# Patient Record
Sex: MALE
[De-identification: names, ages, dates, MRNs, and addresses within clinical notes are randomized per-mention and may not be internally consistent; named-entity substitution may affect disease eponyms.]

---

## 2021-12-23 ENCOUNTER — NON-APPOINTMENT (OUTPATIENT)
Age: 61
End: 2021-12-23

## 2021-12-23 ENCOUNTER — APPOINTMENT (OUTPATIENT)
Dept: UROLOGY | Facility: HOSPITAL | Age: 61
End: 2021-12-23

## 2021-12-27 LAB — SARS-COV-2 N GENE NPH QL NAA+PROBE: NOT DETECTED

## 2022-02-17 ENCOUNTER — APPOINTMENT (OUTPATIENT)
Dept: CARDIOLOGY | Facility: CLINIC | Age: 62
End: 2022-02-17
Payer: COMMERCIAL

## 2022-02-17 PROCEDURE — 93306 TTE W/DOPPLER COMPLETE: CPT

## 2022-02-26 ENCOUNTER — NON-APPOINTMENT (OUTPATIENT)
Age: 62
End: 2022-02-26

## 2022-02-27 ENCOUNTER — NON-APPOINTMENT (OUTPATIENT)
Age: 62
End: 2022-02-27

## 2023-06-12 ENCOUNTER — NON-APPOINTMENT (OUTPATIENT)
Age: 63
End: 2023-06-12

## 2023-06-12 DIAGNOSIS — Z00.00 ENCOUNTER FOR GENERAL ADULT MEDICAL EXAMINATION W/OUT ABNORMAL FINDINGS: ICD-10-CM

## 2023-06-12 DIAGNOSIS — E78.5 HYPERLIPIDEMIA, UNSPECIFIED: ICD-10-CM

## 2023-06-12 LAB
HCT VFR BLD CALC: 41.5 %
HGB BLD-MCNC: 13.4 G/DL
MCHC RBC-ENTMCNC: 21.5 PG
MCHC RBC-ENTMCNC: 32.3 G/DL
MCV RBC AUTO: 66.5 FL
PLATELET # BLD AUTO: 218 K/UL
PMV BLD: 11.5 FL
RBC # BLD: 6.24 M/UL
RBC # FLD: 14.9 %
WBC # FLD AUTO: 4.76 K/UL

## 2023-06-13 ENCOUNTER — NON-APPOINTMENT (OUTPATIENT)
Age: 63
End: 2023-06-13

## 2023-06-13 LAB
ALBUMIN SERPL ELPH-MCNC: 4.9 G/DL
ALP BLD-CCNC: 78 U/L
ALT SERPL-CCNC: 24 U/L
ANION GAP SERPL CALC-SCNC: 14 MMOL/L
AST SERPL-CCNC: 27 U/L
BILIRUB SERPL-MCNC: 0.7 MG/DL
BUN SERPL-MCNC: 18 MG/DL
CALCIUM SERPL-MCNC: 9.6 MG/DL
CHLORIDE SERPL-SCNC: 94 MMOL/L
CHOLEST SERPL-MCNC: 201 MG/DL
CO2 SERPL-SCNC: 27 MMOL/L
CREAT SERPL-MCNC: 1.1 MG/DL
EGFR: 76 ML/MIN/1.73M2
GLUCOSE SERPL-MCNC: 99 MG/DL
HDLC SERPL-MCNC: 77 MG/DL
LDLC SERPL CALC-MCNC: 110 MG/DL
NONHDLC SERPL-MCNC: 124 MG/DL
POTASSIUM SERPL-SCNC: 4.2 MMOL/L
PROT SERPL-MCNC: 7.4 G/DL
PSA SERPL-MCNC: 0.81 NG/ML
SODIUM SERPL-SCNC: 135 MMOL/L
TRIGL SERPL-MCNC: 68 MG/DL

## 2023-12-22 DIAGNOSIS — N40.0 BENIGN PROSTATIC HYPERPLASIA WITHOUT LOWER URINARY TRACT SYMPMS: ICD-10-CM

## 2023-12-26 LAB
ALBUMIN SERPL ELPH-MCNC: 4.9 G/DL
ALP BLD-CCNC: 65 U/L
ALT SERPL-CCNC: 19 U/L
ANION GAP SERPL CALC-SCNC: 15 MMOL/L
AST SERPL-CCNC: 28 U/L
BASOPHILS # BLD AUTO: 0.03 K/UL
BASOPHILS NFR BLD AUTO: 0.5 %
BILIRUB SERPL-MCNC: 0.6 MG/DL
BUN SERPL-MCNC: 18 MG/DL
CALCIUM SERPL-MCNC: 10.1 MG/DL
CHLORIDE SERPL-SCNC: 94 MMOL/L
CHOLEST SERPL-MCNC: 238 MG/DL
CO2 SERPL-SCNC: 27 MMOL/L
CREAT SERPL-MCNC: 1.1 MG/DL
EGFR: 76 ML/MIN/1.73M2
EOSINOPHIL # BLD AUTO: 0.23 K/UL
EOSINOPHIL NFR BLD AUTO: 4 %
GLUCOSE SERPL-MCNC: 98 MG/DL
HCT VFR BLD CALC: 42.4 %
HDLC SERPL-MCNC: 71 MG/DL
HGB BLD-MCNC: 13.1 G/DL
IMM GRANULOCYTES NFR BLD AUTO: 0.2 %
LDLC SERPL CALC-MCNC: 147 MG/DL
LYMPHOCYTES # BLD AUTO: 2.85 K/UL
LYMPHOCYTES NFR BLD AUTO: 50.2 %
MAN DIFF?: NORMAL
MCHC RBC-ENTMCNC: 21.1 PG
MCHC RBC-ENTMCNC: 30.9 G/DL
MCV RBC AUTO: 68.3 FL
MONOCYTES # BLD AUTO: 0.65 K/UL
MONOCYTES NFR BLD AUTO: 11.4 %
NEUTROPHILS # BLD AUTO: 1.91 K/UL
NEUTROPHILS NFR BLD AUTO: 33.7 %
NONHDLC SERPL-MCNC: 167 MG/DL
PLATELET # BLD AUTO: 236 K/UL
POTASSIUM SERPL-SCNC: 3.9 MMOL/L
PROT SERPL-MCNC: 7.3 G/DL
PSA FREE FLD-MCNC: 51 %
PSA FREE SERPL-MCNC: 0.38 NG/ML
PSA SERPL-MCNC: 0.73 NG/ML
RBC # BLD: 6.21 M/UL
RBC # FLD: 15 %
SODIUM SERPL-SCNC: 136 MMOL/L
TRIGL SERPL-MCNC: 101 MG/DL
WBC # FLD AUTO: 5.68 K/UL

## 2024-06-12 ENCOUNTER — NON-APPOINTMENT (OUTPATIENT)
Age: 64
End: 2024-06-12

## 2024-06-12 LAB
ALBUMIN SERPL ELPH-MCNC: 4.8 G/DL
ALP BLD-CCNC: 74 U/L
ALT SERPL-CCNC: 20 U/L
ANION GAP SERPL CALC-SCNC: 13 MMOL/L
APPEARANCE: CLEAR
AST SERPL-CCNC: 25 U/L
BASOPHILS # BLD AUTO: 0.03 K/UL
BASOPHILS NFR BLD AUTO: 0.5 %
BILIRUB SERPL-MCNC: 0.6 MG/DL
BILIRUBIN URINE: NEGATIVE
BLOOD URINE: NEGATIVE
BUN SERPL-MCNC: 18 MG/DL
CALCIUM SERPL-MCNC: 9.7 MG/DL
CHLORIDE SERPL-SCNC: 96 MMOL/L
CHOLEST SERPL-MCNC: 187 MG/DL
CO2 SERPL-SCNC: 28 MMOL/L
COLOR: YELLOW
CREAT SERPL-MCNC: 1.1 MG/DL
EGFR: 75 ML/MIN/1.73M2
EOSINOPHIL # BLD AUTO: 0.09 K/UL
EOSINOPHIL NFR BLD AUTO: 1.6 %
GLUCOSE QUALITATIVE U: NEGATIVE MG/DL
GLUCOSE SERPL-MCNC: 98 MG/DL
HCT VFR BLD CALC: 41.1 %
HDLC SERPL-MCNC: 66 MG/DL
HGB BLD-MCNC: 12.7 G/DL
IMM GRANULOCYTES NFR BLD AUTO: 0.2 %
KETONES URINE: NEGATIVE MG/DL
LDLC SERPL CALC-MCNC: 110 MG/DL
LEUKOCYTE ESTERASE URINE: NEGATIVE
LYMPHOCYTES # BLD AUTO: 2.52 K/UL
LYMPHOCYTES NFR BLD AUTO: 46.2 %
MAN DIFF?: NORMAL
MCHC RBC-ENTMCNC: 21 PG
MCHC RBC-ENTMCNC: 30.9 G/DL
MCV RBC AUTO: 68 FL
MONOCYTES # BLD AUTO: 0.54 K/UL
MONOCYTES NFR BLD AUTO: 9.9 %
NEUTROPHILS # BLD AUTO: 2.27 K/UL
NEUTROPHILS NFR BLD AUTO: 41.6 %
NITRITE URINE: NEGATIVE
NONHDLC SERPL-MCNC: 121 MG/DL
PH URINE: 7.5
PLATELET # BLD AUTO: 212 K/UL
PMV BLD AUTO: 0 /100 WBCS
POTASSIUM SERPL-SCNC: 4.5 MMOL/L
PROT SERPL-MCNC: 7.3 G/DL
PROTEIN URINE: NEGATIVE MG/DL
PSA SERPL-MCNC: 0.73 NG/ML
RBC # BLD: 6.04 M/UL
RBC # FLD: 15.3 %
SODIUM SERPL-SCNC: 137 MMOL/L
SPECIFIC GRAVITY URINE: 1.02
TRIGL SERPL-MCNC: 54 MG/DL
UROBILINOGEN URINE: 1 MG/DL
WBC # FLD AUTO: 5.46 K/UL

## 2024-06-13 LAB
ESTIMATED AVERAGE GLUCOSE: 117 MG/DL
HBA1C MFR BLD HPLC: 5.7 %

## 2024-10-20 ENCOUNTER — EMERGENCY (EMERGENCY)
Facility: HOSPITAL | Age: 64
LOS: 0 days | Discharge: HOME | End: 2024-10-20
Attending: EMERGENCY MEDICINE
Payer: COMMERCIAL

## 2024-10-20 VITALS
SYSTOLIC BLOOD PRESSURE: 158 MMHG | RESPIRATION RATE: 16 BRPM | WEIGHT: 139.99 LBS | TEMPERATURE: 98 F | DIASTOLIC BLOOD PRESSURE: 66 MMHG | HEART RATE: 66 BPM | OXYGEN SATURATION: 99 %

## 2024-10-20 DIAGNOSIS — Z91.013 ALLERGY TO SEAFOOD: ICD-10-CM
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
## 2024-10-20 DIAGNOSIS — M79.641 PAIN IN RIGHT HAND: ICD-10-CM
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
## 2024-10-20 DIAGNOSIS — S01.511A LACERATION WITHOUT FOREIGN BODY OF LIP, INITIAL ENCOUNTER: ICD-10-CM

## 2024-10-20 DIAGNOSIS — S69.92XA UNSPECIFIED INJURY OF LEFT WRIST, HAND AND FINGER(S), INITIAL ENCOUNTER: ICD-10-CM

## 2024-10-20 DIAGNOSIS — S60.041A CONTUSION OF RIGHT RING FINGER WITHOUT DAMAGE TO NAIL, INITIAL ENCOUNTER: ICD-10-CM

## 2024-10-20 DIAGNOSIS — K03.81 CRACKED TOOTH: ICD-10-CM

## 2024-10-20 DIAGNOSIS — S01.81XA LACERATION WITHOUT FOREIGN BODY OF OTHER PART OF HEAD, INITIAL ENCOUNTER: ICD-10-CM

## 2024-10-20 PROCEDURE — 12052 INTMD RPR FACE/MM 2.6-5.0 CM: CPT

## 2024-10-20 PROCEDURE — 73130 X-RAY EXAM OF HAND: CPT | Mod: RT

## 2024-10-20 PROCEDURE — 99283 EMERGENCY DEPT VISIT LOW MDM: CPT | Mod: 57

## 2024-10-20 PROCEDURE — 71046 X-RAY EXAM CHEST 2 VIEWS: CPT | Mod: 26

## 2024-10-20 PROCEDURE — 71046 X-RAY EXAM CHEST 2 VIEWS: CPT

## 2024-10-20 PROCEDURE — 13151 CMPLX RPR E/N/E/L 1.1-2.5 CM: CPT | Mod: XS

## 2024-10-20 PROCEDURE — 99284 EMERGENCY DEPT VISIT MOD MDM: CPT | Mod: 25

## 2024-10-20 PROCEDURE — 73130 X-RAY EXAM OF HAND: CPT | Mod: 26,RT

## 2024-10-20 PROCEDURE — 99285 EMERGENCY DEPT VISIT HI MDM: CPT

## 2024-10-20 PROCEDURE — 40831 REPAIR MOUTH LACERATION: CPT

## 2024-10-20 RX ORDER — AMOXICILLIN 250 MG/5ML
1 SUSPENSION, RECONSTITUTED, ORAL (ML) ORAL
Qty: 21 | Refills: 0
Start: 2024-10-20 | End: 2024-10-26

## 2024-10-20 NOTE — PROCEDURE NOTE - NSICDXPROCEDURE_GEN_ALL_CORE_FT
PROCEDURES:  Repair, ear, eyelid, lip, or nose, complex, 1.1 cm to 2.5 cm 20-Oct-2024 15:01:16  Corazon Bentley
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
## 2024-10-20 NOTE — ED ADULT NURSE NOTE - CHPI ED NUR SYMPTOMS NEG
JOE PORTILLO (Key: [id number])     This request has been approved.  Please note any additional information provided by [facility] [employer] at the bottom of your screen.     no drainage/no pain

## 2024-10-20 NOTE — ED PROVIDER NOTE - NSFOLLOWUPINSTRUCTIONS_ED_ALL_ED_FT
Make sure that you follow-up with plastic surgery this week to have your sutures removed.  Follow-up with your dentist within the next 24 hours.  Antibiotics to take 3 times a day for 1 week have been sent to your pharmacy.  Return to the ER for any new or worsening headache, vomiting, change in vision, neck pain or trouble breathing.  Also follow-up with employee health this week to discuss when your last tetanus vaccination was has and should be updated if greater than 10 years ago.

## 2024-10-20 NOTE — ED PROVIDER NOTE - EKG/XRAY ADDITIONAL INFORMATION
JFK: I, Adam Mccray, have done an independent interpretation of the chest x-ray film and my findings are as follows: No focal consolidation, signs of opacities or edema, pneumothorax, free air or evidence of trauma.
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
## 2024-10-20 NOTE — CONSULT NOTE ADULT - NS ATTEND AMEND GEN_ALL_CORE FT
Pt seen and examined  I performed procedure Pt seen as examined  two lacerations one external lip and one intraoral  please refer to procedure note for details of procedure

## 2024-10-20 NOTE — ED PROVIDER NOTE - PHYSICAL EXAMINATION
Gen: NAD, AOx3  Head: NCAT  HEENT: PERRL, oral mucosa moist, normal conjunctiva, oropharynx clear without exudate or erythema  Lung: CTAB, no respiratory distress, no wheezing, rales, rhonchi  CV: normal s1/s2, rrr, Normal perfusion, pulses 2+ throughout  Abd: soft, NTND, no CVA tenderness  Genitourinary: no pelvic tenderness  MSK: No edema, no visible deformities, full range of motion in all 4 extremities, RUE: 2+ pulse, AROM, edema to 1st digit with mild TTP, ecchymosis to 4th digit  Neuro: CN II-XII grossly intact, No focal neurologic deficits, sensation intact, strength 5/5 BUE/BLE, steady gait   Skin: No rash, laceration to L upper lip   Psych: normal affect

## 2024-10-20 NOTE — ED PROVIDER NOTE - PATIENT PORTAL LINK FT
You can access the FollowMyHealth Patient Portal offered by [facility] by registering at the following website: [email or web address]. By joining [employer]’s FollowMyHealth portal, you will also be able to view your health information using other applications (apps) compatible with our system.

## 2024-10-25 ENCOUNTER — APPOINTMENT (OUTPATIENT)
Dept: PLASTIC SURGERY | Facility: CLINIC | Age: 64
End: 2024-10-25
Payer: COMMERCIAL

## 2024-10-25 VITALS — BODY MASS INDEX: 20.4 KG/M2 | HEIGHT: 67 IN | WEIGHT: 130 LBS

## 2024-10-25 VITALS — WEIGHT: 130 LBS | HEIGHT: 67 IN | BODY MASS INDEX: 20.4 KG/M2

## 2024-10-25 DIAGNOSIS — Z86.79 PERSONAL HISTORY OF OTHER DISEASES OF THE CIRCULATORY SYSTEM: ICD-10-CM

## 2024-10-25 DIAGNOSIS — S01.511A LACERATION W/OUT FOREIGN BODY OF LIP, INITIAL ENCOUNTER: ICD-10-CM

## 2024-10-25 PROCEDURE — 99024 POSTOP FOLLOW-UP VISIT: CPT

## 2024-10-25 RX ORDER — HYDROCHLOROTHIAZIDE 50 MG/1
50 TABLET ORAL
Refills: 0 | Status: ACTIVE | COMMUNITY